# Patient Record
Sex: FEMALE | Race: WHITE | NOT HISPANIC OR LATINO | Employment: OTHER | URBAN - METROPOLITAN AREA
[De-identification: names, ages, dates, MRNs, and addresses within clinical notes are randomized per-mention and may not be internally consistent; named-entity substitution may affect disease eponyms.]

---

## 2024-06-13 ENCOUNTER — HOSPITAL ENCOUNTER (OUTPATIENT)
Dept: RADIOLOGY | Facility: OTHER | Age: 84
Discharge: HOME | End: 2024-06-13
Attending: INTERNAL MEDICINE
Payer: MEDICARE

## 2024-06-13 DIAGNOSIS — K57.90 DIVERTICULOSIS: ICD-10-CM

## 2024-06-13 DIAGNOSIS — R19.4 CHANGE IN BOWEL HABITS: ICD-10-CM

## 2024-06-13 DIAGNOSIS — Z86.010 PERSONAL HISTORY OF COLONIC POLYPS: ICD-10-CM

## 2024-06-13 DIAGNOSIS — K59.00 CONSTIPATION: ICD-10-CM

## 2024-06-13 DIAGNOSIS — K21.9 GERD (GASTROESOPHAGEAL REFLUX DISEASE): ICD-10-CM

## 2024-06-13 DIAGNOSIS — R19.09 LEFT GROIN MASS: ICD-10-CM

## 2024-06-13 PROCEDURE — 74177 CT ABD & PELVIS W/CONTRAST: CPT | Mod: 26,,, | Performed by: RADIOLOGY

## 2024-06-13 PROCEDURE — 74177 CT ABD & PELVIS W/CONTRAST: CPT | Mod: TC

## 2024-06-13 PROCEDURE — 25500020 PHARM REV CODE 255: Performed by: INTERNAL MEDICINE

## 2024-06-13 PROCEDURE — A9698 NON-RAD CONTRAST MATERIALNOC: HCPCS | Performed by: INTERNAL MEDICINE

## 2024-06-13 RX ADMIN — IOHEXOL 75 ML: 350 INJECTION, SOLUTION INTRAVENOUS at 06:06

## 2024-06-13 RX ADMIN — IOHEXOL 1000 ML: 9 SOLUTION ORAL at 05:06

## 2024-08-13 ENCOUNTER — TELEPHONE (OUTPATIENT)
Dept: SPORTS MEDICINE | Facility: CLINIC | Age: 84
End: 2024-08-13
Payer: MEDICARE

## 2024-08-13 NOTE — TELEPHONE ENCOUNTER
Spoke to the Pt about her question for the procedure.  We discussed the details about the appt and the consult. She confirmed the info that was discussed.

## 2024-08-13 NOTE — TELEPHONE ENCOUNTER
----- Message from Heriberto Stoll sent at 8/13/2024 10:39 AM CDT -----  Regarding: PT'S REQUESTING A CALL BACK REGARDING GETTING PLASMA INJECTIONS IN HANDS  Contact: pt  Pt was referred to provider by a family member and is wanting to see if she can be seen as soon as possible. Pt is having issues with both hand.the patient was scheduled for 9/17 for Dr.Sisco orozco but is wanting to be seen  by Mauricio Arechiga.     Confirmed contact info below:  Contact Name: Amanda Nuñezmaddy  Phone Number: [phone]

## 2024-08-14 DIAGNOSIS — M79.642 BILATERAL HAND PAIN: Primary | ICD-10-CM

## 2024-08-14 DIAGNOSIS — M79.641 BILATERAL HAND PAIN: Primary | ICD-10-CM

## 2024-08-22 ENCOUNTER — TELEPHONE (OUTPATIENT)
Dept: SPORTS MEDICINE | Facility: CLINIC | Age: 84
End: 2024-08-22
Payer: MEDICARE

## 2024-08-22 ENCOUNTER — PATIENT MESSAGE (OUTPATIENT)
Dept: SPORTS MEDICINE | Facility: CLINIC | Age: 84
End: 2024-08-22
Payer: MEDICARE

## 2024-08-22 NOTE — TELEPHONE ENCOUNTER
LVM for the Pt asking for her to call the office back so that we can discuss her appt and the need to vita. Provided call back number

## 2024-09-05 ENCOUNTER — APPOINTMENT (OUTPATIENT)
Dept: RADIOLOGY | Facility: OTHER | Age: 84
End: 2024-09-05
Attending: ORTHOPAEDIC SURGERY
Payer: MEDICARE

## 2024-09-05 ENCOUNTER — OFFICE VISIT (OUTPATIENT)
Dept: SPORTS MEDICINE | Facility: CLINIC | Age: 84
End: 2024-09-05
Payer: MEDICARE

## 2024-09-05 VITALS
WEIGHT: 149.06 LBS | SYSTOLIC BLOOD PRESSURE: 113 MMHG | HEIGHT: 68 IN | BODY MASS INDEX: 22.59 KG/M2 | DIASTOLIC BLOOD PRESSURE: 72 MMHG

## 2024-09-05 DIAGNOSIS — M79.642 BILATERAL HAND PAIN: Primary | ICD-10-CM

## 2024-09-05 DIAGNOSIS — M79.641 BILATERAL HAND PAIN: Primary | ICD-10-CM

## 2024-09-05 DIAGNOSIS — G89.29 CHRONIC PAIN OF RIGHT THUMB: Primary | ICD-10-CM

## 2024-09-05 DIAGNOSIS — M79.641 BILATERAL HAND PAIN: ICD-10-CM

## 2024-09-05 DIAGNOSIS — M79.642 BILATERAL HAND PAIN: ICD-10-CM

## 2024-09-05 DIAGNOSIS — M79.644 CHRONIC PAIN OF RIGHT THUMB: Primary | ICD-10-CM

## 2024-09-05 DIAGNOSIS — M18.11 PRIMARY OSTEOARTHRITIS OF FIRST CARPOMETACARPAL JOINT OF RIGHT HAND: ICD-10-CM

## 2024-09-05 PROCEDURE — 99204 OFFICE O/P NEW MOD 45 MIN: CPT | Mod: S$PBB,,, | Performed by: ORTHOPAEDIC SURGERY

## 2024-09-05 PROCEDURE — 99213 OFFICE O/P EST LOW 20 MIN: CPT | Mod: PBBFAC,25,PN | Performed by: ORTHOPAEDIC SURGERY

## 2024-09-05 PROCEDURE — 73130 X-RAY EXAM OF HAND: CPT | Mod: TC,50,PN

## 2024-09-05 PROCEDURE — 73130 X-RAY EXAM OF HAND: CPT | Mod: 26,50,, | Performed by: RADIOLOGY

## 2024-09-05 PROCEDURE — 99999 PR PBB SHADOW E&M-EST. PATIENT-LVL III: CPT | Mod: PBBFAC,,, | Performed by: ORTHOPAEDIC SURGERY

## 2024-09-05 NOTE — PROGRESS NOTES
CC: right thumb pain     83 y.o. Female presents today for evaluation of her left thumb pain. She admits to right thumb pain at the CMC joint for several months that has been gradually worsening. She states she is interested in discussing PRP as a possible treatment option.     How long: Several months  What makes it better: Rest and Aleve  What makes it worse: Activity  Does it radiate: Denies  Attempted treatments: Occasional Aleve as needed, usually only several times/week  Pain score: 7/10   Any mechanical symptoms: Denies  Feelings of instability: Denies  Affecting ADL: Yes    Occupation: Retired       PAST MEDICAL HISTORY:   Past Medical History:   Diagnosis Date    GERD (gastroesophageal reflux disease)        PAST SURGICAL HISTORY:   Past Surgical History:   Procedure Laterality Date    TOTAL ABDOMINAL HYSTERECTOMY         FAMILY HISTORY:   Family History   Problem Relation Name Age of Onset    Heart failure Father         SOCIAL HISTORY:   Social History     Socioeconomic History    Marital status:    Tobacco Use    Smoking status: Never     Social Determinants of Health     Financial Resource Strain: Low Risk  (8/5/2024)    Received from [facility]    Overall Financial Resource Strain (CARDIA)     Difficulty of Paying Living Expenses: Not hard at all   Food Insecurity: No Food Insecurity (8/5/2024)    Received from [facility]    Hunger Vital Sign     Worried About Running Out of Food in the Last Year: Never true     Ran Out of Food in the Last Year: Never true   Transportation Needs: No Transportation Needs (8/5/2024)    Received from [facility]    PRAPARE - Transportation     Lack of Transportation (Medical): No     Lack of Transportation (Non-Medical): No   Physical Activity: Sufficiently Active (8/5/2024)    Received from [facility]    Exercise Vital Sign     Days of Exercise per Week: 5 days     Minutes of Exercise per Session: 60 min   Stress: No Stress Concern Present (8/5/2024)     "Received from [facility] - Occupational Stress Questionnaire     Feeling of Stress : Only a little       MEDICATIONS:     Current Outpatient Medications:     estradiol 0.05 mg/24 hr td ptwk (CLIMARA) 0.05 mg/24 hr PTWK, Place 1 patch onto the skin every 7 days., Disp: , Rfl:     ALLERGIES:   Review of patient's allergies indicates:   Allergen Reactions    Compazine [prochlorperazine edisylate]     Ephedrine     Trilafon [perphenazine]         PHYSICAL EXAMINATION:  /72   Ht 5' 8" (1.727 m)   Wt 67.6 kg (149 lb 0.5 oz)   BMI 22.66 kg/m²   Vitals signs and nursing note have been reviewed.  General: In no acute distress, well developed, well nourished, no diaphoresis  Eyes: EOM full and smooth, no eye redness or discharge  HENT: normocephalic and atraumatic, neck supple, trachea midline, no nasal discharge, no external ear redness or discharge  Cardiovascular: 2+ and symmetric radial pulses bilaterally, no LE edema  Lungs: respirations non-labored, no conversational dyspnea   Abd: non-distended, no rigidity  MSK: no amputation or deformity, no swelling of extremities  Neuro: AAOx3, CN2-12 grossly intact  Skin: No rashes, warm and dry  Psychiatric: cooperative, pleasant, mood and affect appropriate for age    HAND: RIGHT  The affected hand is compared to the contralateral hand .    Observation:  No evidence of edema, erythema, ecchymosis, or effusion.  No asymmetries; muscle atrophy; ganglion cysts; or bony abnormalities including ulnar deviation.  + Heberdens or Brouchards nodes.  No swan-neck or boutonnière deformities.    No clubbing of the digits.    Tenderness:  No tenderness at radial styloid, Jl's tubercle, ulnar styloid.  No tenderness at anatomic snuff box, scaphoid tubercle, scapholunate junction.  No tenderness at TFCC.  No tenderness at pisiform, hamate, metacarpals and phalanges.  No tenderness over median nerve at the carpal tunnel.  + tenderness " CMC joint of the right thumb     Range of Motion:  Active wrist extension to 70° on left and 70° on right.    Active wrist flexion to 80° on left and 80° on right.    Active radial deviation to 20° on left and 20° on right.    Active ulnar deviation to 30° on left and 30° on right.    Active pronation to 80° on left and 80° on right.    Active supination to 80° on left and 80° on right.    Full active flexion and extension at the MCP, PIP, and DIP bilaterally.   Active thumb motion full in all planes, pain with flexion and extension at the CMC joint.    Strength Testing:  Wrist extension - 5/5 on left and 5/5 on right  Wrist flexion - 5/5 on left and 5/5 on right  Ulnar deviation - 5/5 on left and 5/5 on right  Radial deviation - 5/5 on left and 5/5 on right  Pronation - 5/5 on left and 5/5 on right  Supination - 5/5 on left and 5/5 on right    Finger flexion - 5/5 on left and 5/5 on right  Finger extension - 5/5 on left and 5/5 on right  Finger abduction - 5/5 on left and 5/5 on right  Finger adduction - 5/5 on left and 5/5 on right    Thumb flexion - 5/5 on left and 5/5 on right  Thumb extension - 5/5 on left and 5/5 on right  Thumb abduction - 5/5 on left and 5/5 on right  Thumb adduction - 5/5 on left and 5/5 on right  Thumb opposition - 5/5 on left and 5/5 on right  Pain with motions on the right    Special Tests:    No laxity with distal radioulnar joint translation.  Negative Stanfords test.     No laxity with phalangeal varus/valgus stress testing.    Finkelsteins test - negative    Axial grind of first CMC joint - positive  No laxity at the MCP UCL of the thumb    Normal fist alignment of the phalanges  No laxity at the RCL and UCL of the PIPs and DIPs of the phalanges.  Normal finger flexion of the FDP and FDS in all phalanges bilaterally.  Able to perform OK sign.    Neurovascular Exam:  Sensation intact to light touch in the median, ulnar, and radial distributions on the hands bilaterally.  Radial and  ulnar pulses intact and symmetric.  Capillary refill intact to <2 seconds in all digits.        IMAGIN. X-ray ordered due to bilateral hand pain   2. X-ray images were reviewed personally by me and then directly with patient.  3. FINDINGS: X-ray images obtained demonstrate no acute fracture or dislocation, osteophyte formation, subchondral sclerosis, and narrowing 1st CMC joint right hand. Osteophyte formation and joint space narrowing scattered in the interphalangeal joints both hands   4. IMPRESSION: As above.       ASSESSMENT:      ICD-10-CM ICD-9-CM   1. Chronic pain of right thumb  M79.644 729.5    G89.29 338.29   2. Primary osteoarthritis of first carpometacarpal joint of right hand  M18.11 715.14         PLAN:  1-2. Right thumb pain/CMC joint osteoarthritis -     - Amanda admits to right thumb pain at the CMC joint for several months that has been progressively worsening. She is interested in discussing orthobiologics as a treatment option.     - XRs ordered in the office today and images were personally reviewed with the patient. See above for further detail.    - We reviewed the natural history of osteoarthritis and a multipronged treatment approach. We reviewed the importance of addressing three different aspects to best manage this condition. Controlling the intra-articular immune reaction through medications and/or injections, improving local stability through bracing and/or injection, and improving functional stability through hip, core, and ankle strength, stability and mobility which may benefit from formal physical therapy.    - EDUCATION FOR PRP: Education provided on the benefits, adverse effects, likely course of treatment and improvement, and post-injection expectations from PRP. Handout given to patient. Reviewed that it is a non-covered cash service. It may takes several treatments to have long standing resolution. The PRP injection may include tenotomy, and this was explained to the  patient. We reviewed that initially after treatment pain may become more intense and this is an expected response. We instructed that improvement may be experienced within the first two weeks and that most improvement will come between weeks 6 and 12. If there is no improvement, we would not likely repeat. If less than 90% improvement is experienced at 12 weeks, we would consider and discuss repeating.      Future planning includes - right thumb CMC ACP joint injection    All questions were answered to the best of my ability and all concerns were addressed at this time.    Follow up in 1-2 weeks for above, or sooner if needed.

## 2024-09-09 ENCOUNTER — TELEPHONE (OUTPATIENT)
Dept: SPORTS MEDICINE | Facility: CLINIC | Age: 84
End: 2024-09-09
Payer: MEDICARE

## 2024-09-09 NOTE — TELEPHONE ENCOUNTER
Spoke to the Pt about her request for her appt change after canceling it.  We discussed that it was taken and that we would need to reschedule.  Pt was fine and understood.  We found another date and time that worked for her. She confirmed the new appt time and date.

## 2024-09-09 NOTE — TELEPHONE ENCOUNTER
----- Message from Virginia Queen sent at 9/9/2024  8:50 AM CDT -----  Regarding: Appt  Contact: pt [phone]  Pt was scheduled for injection 9/10 @11 pt cancelled, pt no longer needs to cancel appt, would like to be added back to the schedule 9/10 @ 11, please call pt @[phone]

## 2024-09-17 ENCOUNTER — OFFICE VISIT (OUTPATIENT)
Dept: SPORTS MEDICINE | Facility: CLINIC | Age: 84
End: 2024-09-17
Payer: MEDICARE

## 2024-09-17 VITALS
HEIGHT: 68 IN | DIASTOLIC BLOOD PRESSURE: 72 MMHG | HEART RATE: 62 BPM | BODY MASS INDEX: 22.55 KG/M2 | SYSTOLIC BLOOD PRESSURE: 110 MMHG | WEIGHT: 148.81 LBS

## 2024-09-17 DIAGNOSIS — M18.11 PRIMARY OSTEOARTHRITIS OF FIRST CARPOMETACARPAL JOINT OF RIGHT HAND: Primary | ICD-10-CM

## 2024-09-17 DIAGNOSIS — G89.29 CHRONIC PAIN OF RIGHT THUMB: ICD-10-CM

## 2024-09-17 DIAGNOSIS — M79.644 CHRONIC PAIN OF RIGHT THUMB: ICD-10-CM

## 2024-09-17 PROCEDURE — 0232T NJX PLATELET PLASMA: CPT | Mod: CSM,,, | Performed by: ORTHOPAEDIC SURGERY

## 2024-09-17 PROCEDURE — 99999 PR PBB SHADOW E&M-EST. PATIENT-LVL III: CPT | Mod: PBBFAC,,, | Performed by: ORTHOPAEDIC SURGERY

## 2024-09-17 PROCEDURE — 99213 OFFICE O/P EST LOW 20 MIN: CPT | Mod: PBBFAC | Performed by: ORTHOPAEDIC SURGERY

## 2024-09-17 PROCEDURE — 99499 UNLISTED E&M SERVICE: CPT | Mod: S$PBB,,, | Performed by: ORTHOPAEDIC SURGERY

## 2024-09-17 NOTE — PROGRESS NOTES
PRE-PROCEDURE DIAGNOSIS:   1. Primary osteoarthritis of first carpometacarpal joint of right hand  ORTHOPEDIC BRACING FOR HOME USE - UPPER EXTREMITY      2. Chronic pain of right thumb  Sports Medicine US - Guidance for Needle Placement    ORTHOPEDIC BRACING FOR HOME USE - UPPER EXTREMITY          PROCEDURE TYPE: ACP under ultrasound guidance     RISKS, BENEFITS, ADVERSE EFFECTS: ACP : We reviewed that this is a medical procedure involving the harvesting of some of the patient's own blood. This tissue will be minimally manipulated and prepared for injection, and after the tissue will be injected into damaged ligaments, tendons, muscle and/or joints for the purpose of strengthening and healing and/or lessening pain at the damaged area(s). The diagnosis, the nature of the treatment, and the theoretical basis of regenerative medicine has been explained. The patient was given the opportunity to ask any questions concerning the specific procedure. We further reviewed that in many medical circles this is considered experimental and/or investigational. Further, it is not standard of care in many medical communities and it is also not covered by insurance. We further reviewed this treatment is part of a treatment program and all parts of the program are essential to a successful outcome, including but not limited to post-injection physical therapy/exercises at home and/or in office. As part of a treatment program, this procedure may involve several injections given at each treatment session AND may require multiple treatment sessions. We reviewed that, while this is inherently a safe procedure, there are risks involved. The specific risks depend on the tissue being aspirated and the reinjection site of treatment.      RISKS INCLUDE, but not be limited to:   COMMON: Soreness and/or moderately increased pain with bruising for several days after each treatment and decreased range of motion from what may be normal for the  "patient on most days. Improvement in pain and function may take 4-12 weeks, occasionally longer in some cases   RARE: I understand these to include, but not be limited to infection, partial or complete tendon rupture, worsening pain, headache, bleeding (bruising or hematoma) allergic reaction (itching, rash, shortness of breath, seizures, and even death).     POTENTIAL BENEFITS include: reduction or elimination of pain and greater function. We also reviewed alternative or conventional methods of treatment and the probability that the proposed treatment will or will not be successful. No guarantee or assurance has been made relative to results that may be expected from these techniques. Furthermore, the patient understands that they may withdraw from the treatment or treatment plan at any time.      PROCEDURE DETAILS:       Timeout: Prior to procedure the correct patient, procedure, and site was verified.     Pure ACP  [employer]  Preparation: Phlebotomy, platelet harvest, and ACP  preparation  Sterile technique was used to phlebotomize 15 cc of the patients blood from a peripheral vein. This blood was  into density-divided layers using an [employer]  centrifuge. The layer of leukocyte poor ACP , a volume of 4 cc, was placed into a sterile syringe in preparation for injection.    Joint Injection: Using ultrasound guidance, the tip of the 22 G x 1.5" needle was seen to directly enter the joint space. Upon confirming proper placement into the joint, the 2 cc ACP  mixture was injected (small joint space did not allow for more than 2 cc to be injected). Care was taken to stay within the involved joint for all ACP  injected.           DISCHARGE CONDITION: The patient tolerated the procedure well and there were no immediate complications. The patient was instructed to call the clinic immediately for any mild to moderate adverse side effects, or to call 911 in the event of an emergency.   "   Complications: none     Estimated blood loss from injection procedure: none     Joint aspirate volume: 0 mL     POST PROCEDURE INSTRUCTIONS: Post-procedure care handout was provided and explained to the patient before leaving clinic.  Following the procedure, a sterile bandage was placed over the injection site.    Description of ultrasound utilization for needle guidance:    Ultrasound guidance was used for needle localization with Son[employer]o [employer] Edge 2, 15-6 MHz (L) probe(s). Images were saved and stored for documentation. The right thumb, CMC joint was well visualized. Dynamic visualization of the needle was continuous throughout the procedure and maintained good position and correct needle placement.

## 2024-09-18 ENCOUNTER — TELEPHONE (OUTPATIENT)
Dept: SPORTS MEDICINE | Facility: CLINIC | Age: 84
End: 2024-09-18
Payer: MEDICARE

## 2024-09-18 NOTE — TELEPHONE ENCOUNTER
KALLIM for the Pt informing her of the info that was discussed at the visit.  If there are any other questions, I provided a call back number.

## 2024-09-18 NOTE — TELEPHONE ENCOUNTER
----- Message from Nahedjohnson Griffiths sent at 9/18/2024 12:29 PM CDT -----  Regarding: Advice  Contact: [phone]  Type:  Needs Medical Advice    Who Called: Amanda Patel called wanting to know if she needing to keep the brace on her right hand  She received an injection in her hand on yesterday      Would the patient rather a call back or a response via [facility]? Call back  Best Call Back Number: [phone]    Additional Information: Please leave message on her to a meeting

## 2024-10-31 ENCOUNTER — OFFICE VISIT (OUTPATIENT)
Dept: SPORTS MEDICINE | Facility: CLINIC | Age: 84
End: 2024-10-31
Payer: MEDICARE

## 2024-10-31 VITALS
HEIGHT: 68 IN | DIASTOLIC BLOOD PRESSURE: 64 MMHG | SYSTOLIC BLOOD PRESSURE: 105 MMHG | WEIGHT: 148.81 LBS | BODY MASS INDEX: 22.55 KG/M2

## 2024-10-31 DIAGNOSIS — M79.644 CHRONIC PAIN OF RIGHT THUMB: Primary | ICD-10-CM

## 2024-10-31 DIAGNOSIS — M18.11 PRIMARY OSTEOARTHRITIS OF FIRST CARPOMETACARPAL JOINT OF RIGHT HAND: ICD-10-CM

## 2024-10-31 DIAGNOSIS — G89.29 CHRONIC PAIN OF RIGHT THUMB: Primary | ICD-10-CM

## 2024-10-31 PROCEDURE — 99213 OFFICE O/P EST LOW 20 MIN: CPT | Mod: S$PBB,,, | Performed by: ORTHOPAEDIC SURGERY

## 2024-10-31 PROCEDURE — 99213 OFFICE O/P EST LOW 20 MIN: CPT | Mod: PBBFAC,PN | Performed by: ORTHOPAEDIC SURGERY

## 2024-10-31 PROCEDURE — 99999 PR PBB SHADOW E&M-EST. PATIENT-LVL III: CPT | Mod: PBBFAC,,, | Performed by: ORTHOPAEDIC SURGERY

## 2025-01-16 ENCOUNTER — OFFICE VISIT (OUTPATIENT)
Dept: SPORTS MEDICINE | Facility: CLINIC | Age: 85
End: 2025-01-16
Payer: MEDICARE

## 2025-01-16 VITALS
DIASTOLIC BLOOD PRESSURE: 71 MMHG | BODY MASS INDEX: 22.55 KG/M2 | WEIGHT: 148.81 LBS | HEIGHT: 68 IN | SYSTOLIC BLOOD PRESSURE: 109 MMHG

## 2025-01-16 DIAGNOSIS — M18.11 PRIMARY OSTEOARTHRITIS OF FIRST CARPOMETACARPAL JOINT OF RIGHT HAND: ICD-10-CM

## 2025-01-16 DIAGNOSIS — M25.561 ACUTE PAIN OF RIGHT KNEE: ICD-10-CM

## 2025-01-16 DIAGNOSIS — M79.2 NEUROPATHIC PAIN: ICD-10-CM

## 2025-01-16 DIAGNOSIS — G89.29 CHRONIC PAIN OF RIGHT THUMB: Primary | ICD-10-CM

## 2025-01-16 DIAGNOSIS — R21 SKIN RASH: ICD-10-CM

## 2025-01-16 DIAGNOSIS — M79.644 CHRONIC PAIN OF RIGHT THUMB: Primary | ICD-10-CM

## 2025-01-16 PROCEDURE — 99214 OFFICE O/P EST MOD 30 MIN: CPT | Mod: S$PBB,,, | Performed by: ORTHOPAEDIC SURGERY

## 2025-01-16 PROCEDURE — 99213 OFFICE O/P EST LOW 20 MIN: CPT | Mod: PBBFAC,PN | Performed by: ORTHOPAEDIC SURGERY

## 2025-01-16 PROCEDURE — 99999 PR PBB SHADOW E&M-EST. PATIENT-LVL III: CPT | Mod: PBBFAC,,, | Performed by: ORTHOPAEDIC SURGERY

## 2025-01-16 RX ORDER — GABAPENTIN 100 MG/1
100 CAPSULE ORAL 3 TIMES DAILY PRN
Qty: 21 CAPSULE | Refills: 0 | Status: SHIPPED | OUTPATIENT
Start: 2025-01-16

## 2025-01-16 NOTE — PROGRESS NOTES
CC: right thumb pain     Amanda is here today for follow up evaluation of her right thumb pain. he had right thumb MCP ACP injection at visit 09/17/2024 and is now 17 weeks post-procedure. She states she is feeling 50% improved at this time. She feels as though she has been able to perform more activities with her thumb and has better strength and control and is pleased by this. She denies new falls or trauma since her last visit.     She notes an acute onset of right knee pain.  She also noticed a red rash at the lateral aspect of the right knee.  She describes her pain as burning and tingling.  She has tried over-the-counter NSAIDs without much improvement.  She has had injections in her knees in the past.  She denies any injury or trauma.    Recall from visit on 10/31/2024   Amanda is here today for follow up evaluation of her right thumb pain. Patient reports her pain is 6/10 today. She had right thumb MCP ACP injection at visit 09/17/2024 and is now 6 weeks post-procedure. She states she continues to appreciate pain at the MCP joint with increased activity, but is overall feeling 25% improved at this time.  She does admit to less pain in her thumb with activities that would usually bother it significantly which she is pleased with.  She denies new falls or trauma since her last visit.     Recall from visit on 09/05/2024  84 y.o. Female presents today for evaluation of her left thumb pain. She admits to right thumb pain at the CMC joint for several months that has been gradually worsening. She states she is interested in discussing PRP as a possible treatment option.   How long: Several months  What makes it better: Rest and Aleve  What makes it worse: Activity  Does it radiate: Denies  Attempted treatments: Occasional Aleve as needed, usually only several times/week  Pain score: 7/10   Any mechanical symptoms: Denies  Feelings of instability: Denies  Affecting ADL: Yes    Occupation: Retired       PAST MEDICAL  HISTORY:   Past Medical History:   Diagnosis Date    GERD (gastroesophageal reflux disease)        PAST SURGICAL HISTORY:   Past Surgical History:   Procedure Laterality Date    TOTAL ABDOMINAL HYSTERECTOMY         FAMILY HISTORY:   Family History   Problem Relation Name Age of Onset    Heart failure Father         SOCIAL HISTORY:   Social History     Socioeconomic History    Marital status:    Tobacco Use    Smoking status: Never     Social Drivers of Health     Financial Resource Strain: Low Risk  (9/16/2024)    Overall Financial Resource Strain (CARDIA)     Difficulty of Paying Living Expenses: Not hard at all   Food Insecurity: No Food Insecurity (9/16/2024)    Hunger Vital Sign     Worried About Running Out of Food in the Last Year: Never true     Ran Out of Food in the Last Year: Never true   Transportation Needs: No Transportation Needs (8/5/2024)    Received from [facility]     Lack of Transportation (Medical): No     Lack of Transportation (Non-Medical): No   Physical Activity: Sufficiently Active (9/16/2024)    Exercise Vital Sign     Days of Exercise per Week: 5 days     Minutes of Exercise per Session: 40 min   Stress: No Stress Concern Present (9/16/2024)    [employer] [employer] [place] [employer] - Occupational Stress Questionnaire     Feeling of Stress : Not at all   Housing Stability: Unknown (9/16/2024)    Housing Stability Vital Sign     Unable to Pay for Housing in the Last Year: No       MEDICATIONS:     Current Outpatient Medications:     estradiol 0.05 mg/24 hr td ptwk (CLIMARA) 0.05 mg/24 hr PTWK, Place 1 patch onto the skin every 7 days., Disp: , Rfl:     gabapentin (NEURONTIN) 100 MG capsule, Take 1 capsule (100 mg total) by mouth 3 (three) times daily as needed (pain)., Disp: 21 capsule, Rfl: 0    ALLERGIES:   Review of patient's allergies indicates:   Allergen Reactions    Compazine [prochlorperazine edisylate]     Ephedrine     Trilafon [perphenazine]   "       PHYSICAL EXAMINATION:  /71 (Patient Position: Sitting)   Ht 5' 8" (1.727 m)   Wt 67.5 kg (148 lb 13 oz)   BMI 22.63 kg/m²   Vitals signs and nursing note have been reviewed.  General: In no acute distress, well developed, well nourished, no diaphoresis  Eyes: EOM full and smooth, no eye redness or discharge  HENT: normocephalic and atraumatic, neck supple, trachea midline, no nasal discharge, no external ear redness or discharge  Cardiovascular: 2+ and symmetric radial pulses bilaterally, no LE edema  Lungs: respirations non-labored, no conversational dyspnea   Abd: non-distended, no rigidity  MSK: no amputation or deformity, no swelling of extremities  Neuro: AAOx3, CN2-12 grossly intact  Skin: No rashes, warm and dry  Psychiatric: cooperative, pleasant, mood and affect appropriate for age    HAND: RIGHT  The affected hand is compared to the contralateral hand .    Observation:  No evidence of edema, erythema, ecchymosis, or effusion.  No asymmetries; muscle atrophy; ganglion cysts; or bony abnormalities including ulnar deviation.  + Heberdens or Brouchards nodes.  No swan-neck or boutonnière deformities.    No clubbing of the digits.    Tenderness:  No tenderness at radial styloid, Jl's tubercle, ulnar styloid.  No tenderness at anatomic snuff box, scaphoid tubercle, scapholunate junction.  No tenderness at TFCC.  No tenderness at pisiform, hamate, metacarpals and phalanges.  No tenderness over median nerve at the carpal tunnel.  No tenderness CMC joint of the right thumb     Range of Motion:  Active wrist extension to 70° on left and 70° on right.    Active wrist flexion to 80° on left and 80° on right.    Active radial deviation to 20° on left and 20° on right.    Active ulnar deviation to 30° on left and 30° on right.    Active pronation to 80° on left and 80° on right.    Active supination to 80° on left and 80° on right.    Full active flexion and extension at the MCP, PIP, and DIP " bilaterally.   Active thumb motion full in all planes, pain with flexion and extension at the CMC joint.    Strength Testing:  Wrist extension - 5/5 on left and 5/5 on right  Wrist flexion - 5/5 on left and 5/5 on right  Ulnar deviation - 5/5 on left and 5/5 on right  Radial deviation - 5/5 on left and 5/5 on right  Pronation - 5/5 on left and 5/5 on right  Supination - 5/5 on left and 5/5 on right    Finger flexion - 5/5 on left and 5/5 on right  Finger extension - 5/5 on left and 5/5 on right  Finger abduction - 5/5 on left and 5/5 on right  Finger adduction - 5/5 on left and 5/5 on right    Thumb flexion - 5/5 on left and 5/5 on right  Thumb extension - 5/5 on left and 5/5 on right  Thumb abduction - 5/5 on left and 5/5 on right  Thumb adduction - 5/5 on left and 5/5 on right  Thumb opposition - 5/5 on left and 5/5 on right  Pain with motions on the right    Special Tests:  No laxity with distal radioulnar joint translation.  Negative Stanfords test.     No laxity with phalangeal varus/valgus stress testing.    Finkelsteins test - negative    Axial grind of first CMC joint - positive  No laxity at the MCP UCL of the thumb    Normal fist alignment of the phalanges  No laxity at the RCL and UCL of the PIPs and DIPs of the phalanges.  Normal finger flexion of the FDP and FDS in all phalanges bilaterally.  Able to perform OK sign.    Neurovascular Exam:  Sensation intact to light touch in the median, ulnar, and radial distributions on the hands bilaterally.  Radial and ulnar pulses intact and symmetric.  Capillary refill intact to <2 seconds in all digits.        IMAGIN. X-ray obtained 2024 due to bilateral hand pain   2. X-ray images were reviewed personally by me and then directly with patient.  3. FINDINGS: X-ray images obtained demonstrate no acute fracture or dislocation, osteophyte formation, subchondral sclerosis, and narrowing 1st CMC joint right hand. Osteophyte formation and joint space narrowing  scattered in the interphalangeal joints both hands   4. IMPRESSION: As above.       ASSESSMENT:      ICD-10-CM ICD-9-CM   1. Chronic pain of right thumb  M79.644 729.5    G89.29 338.29   2. Primary osteoarthritis of first carpometacarpal joint of right hand  M18.11 715.14   3. Acute pain of right knee  M25.561 719.46   4. Skin rash  R21 782.1   5. Neuropathic pain  M79.2 729.2         PLAN:  1-2. Right thumb pain/CMC joint osteoarthritis - improved    - Amanda had right thumb MCP ACP injection at visit 09/17/2024 and is now 17 weeks post-procedure. She continues to endorse pain at the MCP joint that is increased in proportion to her activity level. She is overall feeling 50% improved at this time and has been able to do more activities with her thumb.     - We reviewed the natural history of osteoarthritis and a multipronged treatment approach. We reviewed the importance of addressing three different aspects to best manage this condition. Controlling the intra-articular immune reaction through medications and/or injections, improving local stability through bracing and/or injection, and improving functional stability through hip, core, and ankle strength, stability and mobility which may benefit from formal physical therapy.    - She is overall doing well.  We can consider repeating this in the future if symptoms worsen.      3-5.  Acute right knee pain/skin rash/neuropathic pain -     - There is a possibility that this acute onset pain is shingles related.  The rash does appear to have a zoster like appearance and symptoms are consistent with it.  It would not be problematic or harmful to treat with gabapentin and this was prescribed today.    - Gabapentin 100 mg up to 3 times daily as needed prescribed.    - Depending on how she does with time and gabapentin, we can consider addressing the knee osteoarthritis with intra-articular injection, exercises, prescription NSAIDs if the rash resolves and symptoms persist.   She was advised to reach back out next week if symptoms persist.      Future planning includes - possible repeat ACP, physical therapy, bracing    All questions were answered to the best of my ability and all concerns were addressed at this time.    Follow up as needed.

## 2025-01-30 ENCOUNTER — OFFICE VISIT (OUTPATIENT)
Dept: SPORTS MEDICINE | Facility: CLINIC | Age: 85
End: 2025-01-30
Payer: MEDICARE

## 2025-01-30 VITALS — HEART RATE: 69 BPM | SYSTOLIC BLOOD PRESSURE: 122 MMHG | DIASTOLIC BLOOD PRESSURE: 66 MMHG

## 2025-01-30 DIAGNOSIS — M17.11 PRIMARY OSTEOARTHRITIS OF RIGHT KNEE: ICD-10-CM

## 2025-01-30 DIAGNOSIS — G89.29 CHRONIC PAIN OF RIGHT KNEE: Primary | ICD-10-CM

## 2025-01-30 DIAGNOSIS — M25.461 EFFUSION, RIGHT KNEE: ICD-10-CM

## 2025-01-30 DIAGNOSIS — M25.561 CHRONIC PAIN OF RIGHT KNEE: Primary | ICD-10-CM

## 2025-01-30 PROCEDURE — 99212 OFFICE O/P EST SF 10 MIN: CPT | Mod: PBBFAC,PN | Performed by: ORTHOPAEDIC SURGERY

## 2025-01-30 PROCEDURE — 99999PBSHW PR PBB SHADOW TECHNICAL ONLY FILED TO HB: Mod: PBBFAC,,,

## 2025-01-30 PROCEDURE — 99999 PR PBB SHADOW E&M-EST. PATIENT-LVL II: CPT | Mod: PBBFAC,,, | Performed by: ORTHOPAEDIC SURGERY

## 2025-01-30 PROCEDURE — 20611 DRAIN/INJ JOINT/BURSA W/US: CPT | Mod: PBBFAC,PN | Performed by: ORTHOPAEDIC SURGERY

## 2025-01-30 RX ORDER — TRIAMCINOLONE ACETONIDE 40 MG/ML
40 INJECTION, SUSPENSION INTRA-ARTICULAR; INTRAMUSCULAR
Status: COMPLETED | OUTPATIENT
Start: 2025-01-30 | End: 2025-01-30

## 2025-01-30 RX ADMIN — TRIAMCINOLONE ACETONIDE 40 MG: 40 INJECTION, SUSPENSION INTRA-ARTICULAR; INTRAMUSCULAR at 02:01

## 2025-01-30 NOTE — PROGRESS NOTES
CC: right knee pain     Amanda is here today for follow up evaluation of her right knee pain. Patient reports her pain is 4/10 today. She has been appreciating worsening right knee pain and swelling over the past several months. She has a history of HA injection in November 2024 but denies appreciating improvement with this. She states her primary complaint is the feeling of tightness and inability to fully flex her knee.      Recall from visit on 01/16/2025  Amanda is here today for follow up evaluation of her right thumb pain. he had right thumb MCP ACP injection at visit 09/17/2024 and is now 17 weeks post-procedure. She states she is feeling 50% improved at this time. She feels as though she has been able to perform more activities with her thumb and has better strength and control and is pleased by this. She denies new falls or trauma since her last visit.     She notes an acute onset of right knee pain.  She also noticed a red rash at the lateral aspect of the right knee.  She describes her pain as burning and tingling.  She has tried over-the-counter NSAIDs without much improvement.  She has had injections in her knees in the past.  She denies any injury or trauma.      PAST MEDICAL HISTORY:   Past Medical History:   Diagnosis Date    GERD (gastroesophageal reflux disease)        PAST SURGICAL HISTORY:   Past Surgical History:   Procedure Laterality Date    TOTAL ABDOMINAL HYSTERECTOMY         FAMILY HISTORY:   Family History   Problem Relation Name Age of Onset    Heart failure Father         SOCIAL HISTORY:   Social History     Socioeconomic History    Marital status:    Tobacco Use    Smoking status: Never     Social Drivers of Health     Financial Resource Strain: Low Risk  (9/16/2024)    Overall Financial Resource Strain (CARDIA)     Difficulty of Paying Living Expenses: Not hard at all   Food Insecurity: No Food Insecurity (9/16/2024)    Hunger Vital Sign     Worried About Running Out of Food in  the Last Year: Never true     Ran Out of Food in the Last Year: Never true   Transportation Needs: No Transportation Needs (8/5/2024)    [facility]     Lack of Transportation (Medical): No     Lack of Transportation (Non-Medical): No   Physical Activity: Sufficiently Active (9/16/2024)    Exercise Vital Sign     Days of Exercise per Week: 5 days     Minutes of Exercise per Session: 40 min   Stress: No Stress Concern Present (9/16/2024)    [place] [employer] [employer] - Occupational Stress Questionnaire     Feeling of Stress : Not at all   Housing Stability: Unknown (9/16/2024)    Housing Stability Vital Sign     Unable to Pay for Housing in the Last Year: No       MEDICATIONS:     Current Outpatient Medications:     estradiol 0.05 mg/24 hr td ptwk (CLIMARA) 0.05 mg/24 hr PTWK, Place 1 patch onto the skin every 7 days., Disp: , Rfl:     gabapentin (NEURONTIN) 100 MG capsule, Take 1 capsule (100 mg total) by mouth 3 (three) times daily as needed (pain)., Disp: 21 capsule, Rfl: 0  No current facility-administered medications for this visit.    ALLERGIES:   Review of patient's allergies indicates:   Allergen Reactions    Compazine [prochlorperazine edisylate]     Ephedrine     Trilafon [perphenazine]         PHYSICAL EXAMINATION:  /66   Pulse 69   Vitals signs and nursing note have been reviewed.  General: In no acute distress, well developed, well nourished, no diaphoresis  Eyes: EOM full and smooth, no eye redness or discharge  HENT: normocephalic and atraumatic, neck supple, trachea midline, no nasal discharge, no external ear redness or discharge  Cardiovascular: 2+ and symmetric radial pulses bilaterally, no LE edema  Lungs: respirations non-labored, no conversational dyspnea   Abd: non-distended, no rigidity  MSK: no amputation or deformity, no swelling of extremities  Neuro: AAOx3, CN2-12 grossly intact  Skin: No rashes, warm and dry  Psychiatric:  cooperative, pleasant, mood and affect appropriate for age    MUSCULOSKELETAL EXAM:    RIGHT KNEE EXAMINATION   Affected side is compared to contralateral knee     Observation:  No edema, erythema, ecchymosis noted. + effusion on the right  No muscle atrophy of the thighs and calves noted.  No obvious bony deformities noted.   No patella diogenes or baja noted.  No genu valgus/varum noted. No recurvatum noted.    No tibial internal/external torsion.    No pes planus/cavus.    Tenderness:  Patella - none    Lateral joint line - +  Quad tendon - none   Medial joint line - +  Patellar tendon - none  Medial plica - none  Tibial tubercle - none   Lateral plica - none  Pes anserine - none   MCL prox - none  Distal ITB - none   MCL distal - none  MFC - none    LCL prox - none  LFC - none    LCL distal - none  Tibia - none    Fibula - none    No obvious bursae, plicae, popliteal cysts, or tendon derangement palpated.          ROM:   Active extension to 0° bilaterally without hyperextension, lag, crepitus, or patellar J sign.    Active flexion to 135° bilaterally.    Strength: (bilaterally)  Knee Flexion - 5/5  Knee Extension - 5/5  Hip Flexion - 5/5  Hip Extension - 5/5  Ankle dorsiflexion - 5/5  Ankle Plantarflexion - 5/5    Patellofemoral Exam:  Patellar ballottement - positive  Bulge sign - positive  Patellar grind - negative  No patellar laxity with medial and lateral translation   No apprehension with medial and lateral patellar translation.     Meniscus Testing:     No pain with terminal extension and flexion.  Daphnes test - negative   Bounce home test - negative    Ligament Testing:  Lachman's test - negative  No laxity with anterior drawer.  No laxity with posterior drawer.    No laxity with varus testing at 0 and 30 degrees.  No laxity with valgus testing at 0 and 30 degrees.    IT Band Testing:  Noble Compression test - negative    Neurovascular Examination:   Right antalgic gait  Sensation intact to light touch  "in the obturator, lateral/intermediate/medial/posterior femoral cutaneous, saphenous, and common peroneal nerves bilaterally.  Pulses intact at the DP and PT arteries bilaterally.    Capillary refill intact <2 seconds in all toes bilaterally.      IMAGIN. X-ray obtained 01/15/2025 due to right knee pain   2. X-ray images were reviewed personally by me and then directly with patient.  3. FINDINGS: X-ray images obtained demonstrate slight tricompartmental narrowing of the knee and slight meniscal mineralization bilaterally. The bones are normally mineralized with no evidence of fracture, dislocation, radiopaque foreign body. There may be a slight suprapatellar pouch effusion. Kellgren and Adarsh grade 2  4. IMPRESSION: As above.       PROCEDURE:  Large Joint Aspiration/Injection  Knee joint, right  Performed by: KEVIN KU.  Authorized by: KEVIN KU  Consent Done?: Yes (Verbal)  Indications: Pain and joint effusion  Site marked: The procedure site was marked   Timeout: Prior to procedure the correct patient, procedure, and site was verified   Location: Knee joint, right  Prep: Patient was prepped with Chlorhexidine and alcohol.  Ultrasound Guidance for needle placement: yes  Procedure: Ethyl Chloride spray was used prior to skin puncture. After cold spray was applied, 2-4 cc's of 0.2% ropivacaine was injected into the skin and superficial tissue at the injection site using a 25G, 1.5" needle to form an anesthetic tunnel and ensure proper placement of the needle into the joint space. After local anesthetic was applied an 18G, 1.5 needle was used to enter the knee joint capsule under US guidance. 27 cc of clear synovial fluid was aspirated. Following aspiration, a 3 cc mixture of 1 cc of 40 mg/ml triamcinolone acetonide and 2 cc of 0.2% ropivacaine were injected into the knee joint.   Approach: superiorlateral  Medications: 40 mg triamcinolone acetonide 40 mg/mL  Patient tolerance: Patient tolerated " the procedure well with no immediate complications. Improvement noted after aspiration. Patient was wrapped following bandaging.    Ultrasound guidance was used for needle localization with SonoSite Edge 2, 15-6 MHz (L)probe(s). Images were saved and stored for documentation. Short and long axis images of the anterior knee were taken prior to injection confirming presence of joint effusion. The knee joint well visualized. Dynamic visualization of the needles was continuous throughout the procedure and maintained good position and correct needle placement.    Triamcinolone:  NDC: [address]-7  LOT: [id number]  EXP: 2026-04       ASSESSMENT:      ICD-10-CM ICD-9-CM   1. Chronic pain of right knee  M25.561 719.46    G89.29 338.29   2. Primary osteoarthritis of right knee  M17.11 715.16   3. Effusion, right knee  M25.461 719.06         PLAN:  1-3. Chronic right knee pain/osteoarthritis/effusion - new issue    - Amanda is here today for follow up evaluation of her right knee pain. She feels as though her shingles rash has improved since her last visit. She does continue to appreciate right knee pain and swelling that is making knee flexion difficult.    - We reviewed the natural history of osteoarthritis and a multipronged treatment approach. We reviewed the importance of addressing three different aspects to best manage this condition. Controlling the intra-articular immune reaction through medications and/or injections, improving local stability through bracing and/or injection, and improving functional stability through hip, core, and ankle strength, stability and mobility which may benefit from formal physical therapy.    - After discussing options she elects to proceed with ultrasound guided right intra-articular knee aspiration and CSI. See above for procedure detail.       Future planning includes - possible repeat CSI in 3 months if helpful, viscosupplementation, intra-articular knee ACP    All questions were  answered to the best of my ability and all concerns were addressed at this time.    Follow up in 3-4 months for above, or sooner if needed.

## 2025-05-15 ENCOUNTER — OFFICE VISIT (OUTPATIENT)
Dept: SPORTS MEDICINE | Facility: CLINIC | Age: 85
End: 2025-05-15
Payer: MEDICARE

## 2025-05-15 VITALS
WEIGHT: 148.81 LBS | HEIGHT: 68 IN | BODY MASS INDEX: 22.55 KG/M2 | SYSTOLIC BLOOD PRESSURE: 126 MMHG | DIASTOLIC BLOOD PRESSURE: 76 MMHG

## 2025-05-15 DIAGNOSIS — G89.29 CHRONIC PAIN OF RIGHT KNEE: Primary | ICD-10-CM

## 2025-05-15 DIAGNOSIS — M25.561 CHRONIC PAIN OF RIGHT KNEE: Primary | ICD-10-CM

## 2025-05-15 DIAGNOSIS — M17.11 PRIMARY OSTEOARTHRITIS OF RIGHT KNEE: ICD-10-CM

## 2025-05-15 PROCEDURE — 99213 OFFICE O/P EST LOW 20 MIN: CPT | Mod: S$PBB,,, | Performed by: ORTHOPAEDIC SURGERY

## 2025-05-15 PROCEDURE — 99999 PR PBB SHADOW E&M-EST. PATIENT-LVL III: CPT | Mod: PBBFAC,,, | Performed by: ORTHOPAEDIC SURGERY

## 2025-05-15 PROCEDURE — 99213 OFFICE O/P EST LOW 20 MIN: CPT | Mod: PBBFAC,PN | Performed by: ORTHOPAEDIC SURGERY

## 2025-05-15 RX ORDER — TRIAMCINOLONE ACETONIDE 40 MG/ML
40 INJECTION, SUSPENSION INTRA-ARTICULAR; INTRAMUSCULAR
Status: CANCELLED | OUTPATIENT
Start: 2025-05-15 | End: 2025-05-15

## 2025-05-15 NOTE — PROGRESS NOTES
CC: right knee pain     Amanda is here today for follow up evaluation of her right knee pain. Patient reports her pain is 0/10 today. She had right knee aspiration and CSI at visit 01/30/2025 and admits to appreciating great improvement following and states she is very pleased with her progress. She denies any complaints today as her ROM has improved as well.    Recall from visit on 01/30/2025  Amanda is here today for follow up evaluation of her right knee pain. Patient reports her pain is 4/10 today. She has been appreciating worsening right knee pain and swelling over the past several months. She has a history of HA injection in November 2024 but denies appreciating improvement with this. She states her primary complaint is the feeling of tightness and inability to fully flex her knee.          PAST MEDICAL HISTORY:   Past Medical History:   Diagnosis Date    GERD (gastroesophageal reflux disease)        PAST SURGICAL HISTORY:   Past Surgical History:   Procedure Laterality Date    TOTAL ABDOMINAL HYSTERECTOMY         FAMILY HISTORY:   Family History   Problem Relation Name Age of Onset    Heart failure Father         SOCIAL HISTORY:   Social History     Socioeconomic History    Marital status:    Tobacco Use    Smoking status: Never     Social Drivers of Health     Financial Resource Strain: Low Risk  (9/16/2024)    Overall Financial Resource Strain (CARDIA)     Difficulty of Paying Living Expenses: Not hard at all   Food Insecurity: No Food Insecurity (9/16/2024)    Hunger Vital Sign     Worried About Running Out of Food in the Last Year: Never true     Ran Out of Food in the Last Year: Never true   Transportation Needs: No Transportation Needs (8/5/2024)    Received from [facility]    PRAPARE - Transportation     Lack of Transportation (Medical): No     Lack of Transportation (Non-Medical): No   Physical Activity: Sufficiently Active (9/16/2024)    Exercise Vital Sign     Days of Exercise per Week: 5  "days     Minutes of Exercise per Session: 40 min   Stress: No Stress Concern Present (9/16/2024)    [employer] - Occupational Stress Questionnaire     Feeling of Stress : Not at all   Housing Stability: Unknown (9/16/2024)    Housing Stability Vital Sign     Unable to Pay for Housing in the Last Year: No       MEDICATIONS:     Current Outpatient Medications:     estradiol 0.05 mg/24 hr td ptwk (CLIMARA) 0.05 mg/24 hr PTWK, Place 1 patch onto the skin every 7 days., Disp: , Rfl:     gabapentin (NEURONTIN) 100 MG capsule, Take 1 capsule (100 mg total) by mouth 3 (three) times daily as needed (pain)., Disp: 21 capsule, Rfl: 0    ALLERGIES:   Review of patient's allergies indicates:   Allergen Reactions    Compazine [prochlorperazine edisylate]     Ephedrine     Trilafon [perphenazine]         PHYSICAL EXAMINATION:  /76 (Patient Position: Sitting)   Ht 5' 8" (1.727 m)   Wt 67.5 kg (148 lb 13 oz)   BMI 22.63 kg/m²   Vitals signs and nursing note have been reviewed.  General: In no acute distress, well developed, well nourished, no diaphoresis  Eyes: EOM full and smooth, no eye redness or discharge  HENT: normocephalic and atraumatic, neck supple, trachea midline, no nasal discharge, no external ear redness or discharge  Cardiovascular: 2+ and symmetric radial pulses bilaterally, no LE edema  Lungs: respirations non-labored, no conversational dyspnea   Abd: non-distended, no rigidity  MSK: no amputation or deformity, no swelling of extremities  Neuro: AAOx3, CN2-12 grossly intact  Skin: No rashes, warm and dry  Psychiatric: cooperative, pleasant, mood and affect appropriate for age    MUSCULOSKELETAL EXAM:    RIGHT KNEE EXAMINATION   Affected side is compared to contralateral knee     Observation:  No edema, erythema, ecchymosis noted. + small effusion on the right  No muscle atrophy of the thighs and calves noted.  No obvious bony deformities noted.   No patella diogenes or baja " noted.  No genu valgus/varum noted. No recurvatum noted.    No tibial internal/external torsion.    No pes planus/cavus.    Tenderness:  Patella - none    Lateral joint line - none  Quad tendon - none   Medial joint line - none  Patellar tendon - none  Medial plica - none  Tibial tubercle - none   Lateral plica - none  Pes anserine - none   MCL prox - none  Distal ITB - none   MCL distal - none  MFC - none    LCL prox - none  LFC - none    LCL distal - none  Tibia - none    Fibula - none    No obvious bursae, plicae, popliteal cysts, or tendon derangement palpated.          ROM:   Active extension to 0° bilaterally without hyperextension, lag, crepitus, or patellar J sign.    Active flexion to 135° bilaterally.    Strength: (bilaterally)  Knee Flexion - 5/5  Knee Extension - 5/5  Hip Flexion - 5/5  Hip Extension - 5/5  Ankle dorsiflexion - 5/5  Ankle Plantarflexion - 5/5    Patellofemoral Exam:  Patellar ballottement - positive  Bulge sign - positive  Patellar grind - negative  No patellar laxity with medial and lateral translation   No apprehension with medial and lateral patellar translation.     Meniscus Testing:     No pain with terminal extension and flexion.  Daphnes test - negative   Bounce home test - negative    Ligament Testing:  Lachman's test - negative  No laxity with anterior drawer.  No laxity with posterior drawer.    No laxity with varus testing at 0 and 30 degrees.  No laxity with valgus testing at 0 and 30 degrees.    IT Band Testing:  Noble Compression test - negative    Neurovascular Examination:   Right antalgic gait  Sensation intact to light touch in the obturator, lateral/intermediate/medial/posterior femoral cutaneous, saphenous, and common peroneal nerves bilaterally.  Pulses intact at the DP and PT arteries bilaterally.    Capillary refill intact <2 seconds in all toes bilaterally.      IMAGIN. X-ray obtained 01/15/2025 due to right knee pain   2. X-ray images were reviewed  personally by me and then directly with patient.  3. FINDINGS: X-ray images obtained demonstrate slight tricompartmental narrowing of the knee and slight meniscal mineralization bilaterally. The bones are normally mineralized with no evidence of fracture, dislocation, radiopaque foreign body. There may be a slight suprapatellar pouch effusion. Kellgren and Adarsh grade 2  4. IMPRESSION: As above.      ASSESSMENT:      ICD-10-CM ICD-9-CM   1. Chronic pain of right knee  M25.561 719.46    G89.29 338.29   2. Primary osteoarthritis of right knee  M17.11 715.16         PLAN:  1-3. Chronic right knee pain/osteoarthritis/effusion - improved    - Amanda is here today for follow up evaluation of her right knee pain. She had right knee aspiration and CSI at visit 01/30/2025 and admits to complete resolution in her pain following. She is very pleased with her progress at this time.    - We reviewed the natural history of osteoarthritis and a multipronged treatment approach. We reviewed the importance of addressing three different aspects to best manage this condition. Controlling the intra-articular immune reaction through medications and/or injections, improving local stability through bracing and/or injection, and improving functional stability through hip, core, and ankle strength, stability and mobility which may benefit from formal physical therapy.    - As she is doing well, continue with current plan. No further action needed today.      Future planning includes - possible repeat CSI in the future if desired, viscosupplementation, intra-articular knee ACP    All questions were answered to the best of my ability and all concerns were addressed at this time.    Follow up as needed.